# Patient Record
Sex: FEMALE | URBAN - METROPOLITAN AREA
[De-identification: names, ages, dates, MRNs, and addresses within clinical notes are randomized per-mention and may not be internally consistent; named-entity substitution may affect disease eponyms.]

---

## 2020-08-31 ENCOUNTER — OFFICE VISIT (OUTPATIENT)
Dept: URBAN - METROPOLITAN AREA CLINIC 17 | Facility: CLINIC | Age: 40
End: 2020-08-31
Payer: COMMERCIAL

## 2020-08-31 DIAGNOSIS — H10.413 CONJUNCTIVITIS - ALLERGIC: ICD-10-CM

## 2020-08-31 DIAGNOSIS — H52.13 MYOPIA, BILATERAL: Primary | ICD-10-CM

## 2020-08-31 PROCEDURE — 92004 COMPRE OPH EXAM NEW PT 1/>: CPT | Performed by: OPTOMETRIST

## 2020-08-31 ASSESSMENT — KERATOMETRY
OS: 43.38
OD: 43.50

## 2020-08-31 ASSESSMENT — VISUAL ACUITY
OS: 20/20
OD: 20/15

## 2020-08-31 ASSESSMENT — INTRAOCULAR PRESSURE
OD: 21
OS: 18

## 2020-08-31 NOTE — IMPRESSION/PLAN
Impression: Myopia, bilateral: H52.13. Plan: New presctiption sent. All risks benefits and effects discussed.

## 2020-08-31 NOTE — IMPRESSION/PLAN
Impression: Conjunctivitis - Allergic: H10.413.  Plan: [place] BID OU, AT PRN, RTC if symptoms persist.

## 2021-12-03 ENCOUNTER — OFFICE VISIT (OUTPATIENT)
Dept: URBAN - METROPOLITAN AREA CLINIC 18 | Facility: CLINIC | Age: 41
End: 2021-12-03
Payer: COMMERCIAL

## 2021-12-03 PROCEDURE — 92310 CONTACT LENS FITTING OU: CPT | Performed by: OPTOMETRIST

## 2021-12-03 PROCEDURE — 92012 INTRM OPH EXAM EST PATIENT: CPT | Performed by: OPTOMETRIST

## 2021-12-03 ASSESSMENT — VISUAL ACUITY
OS: 20/20
OD: 20/20

## 2021-12-03 ASSESSMENT — INTRAOCULAR PRESSURE
OS: 12
OD: 16

## 2021-12-03 NOTE — IMPRESSION/PLAN
Impression: Myopia, bilateral: H52.13. Plan: Finalized New [employer]. Patient education on appropriate options of eye glasses. Return to clinic in one year for complete eye exam and refraction.

## 2024-10-07 ENCOUNTER — OFFICE VISIT (OUTPATIENT)
Dept: URBAN - METROPOLITAN AREA CLINIC 18 | Facility: CLINIC | Age: 44
End: 2024-10-07
Payer: COMMERCIAL

## 2024-10-07 DIAGNOSIS — H52.13 MYOPIA, BILATERAL: Primary | ICD-10-CM

## 2024-10-07 PROCEDURE — 92014 COMPRE OPH EXAM EST PT 1/>: CPT

## 2024-10-07 PROCEDURE — 92310 CONTACT LENS FITTING OU: CPT

## 2024-10-07 ASSESSMENT — INTRAOCULAR PRESSURE
OD: 19
OS: 17

## 2024-10-07 ASSESSMENT — VISUAL ACUITY
OD: 20/20
OS: 20/20

## 2025-02-04 ENCOUNTER — OFFICE VISIT (OUTPATIENT)
Dept: URBAN - METROPOLITAN AREA CLINIC 18 | Facility: CLINIC | Age: 45
End: 2025-02-04

## 2025-02-04 DIAGNOSIS — H52.13 MYOPIA, BILATERAL: Primary | ICD-10-CM

## 2025-02-04 PROCEDURE — 92310 CONTACT LENS FITTING OU: CPT

## 2025-02-11 ENCOUNTER — OFFICE VISIT (OUTPATIENT)
Dept: URBAN - METROPOLITAN AREA CLINIC 18 | Facility: CLINIC | Age: 45
End: 2025-02-11

## 2025-02-11 DIAGNOSIS — H52.13 MYOPIA, BILATERAL: Primary | ICD-10-CM

## 2025-02-11 PROCEDURE — 92310 CONTACT LENS FITTING OU: CPT
